# Patient Record
Sex: FEMALE | Race: BLACK OR AFRICAN AMERICAN | NOT HISPANIC OR LATINO | Employment: PART TIME | RURAL
[De-identification: names, ages, dates, MRNs, and addresses within clinical notes are randomized per-mention and may not be internally consistent; named-entity substitution may affect disease eponyms.]

---

## 2019-06-01 ENCOUNTER — HISTORICAL (OUTPATIENT)
Dept: ADMINISTRATIVE | Facility: HOSPITAL | Age: 59
End: 2019-06-01

## 2019-06-05 LAB
LAB AP CLINICAL INFORMATION: NORMAL
LAB AP COMMENTS: NORMAL
LAB AP DIAGNOSIS - HISTORICAL: NORMAL
LAB AP GROSS PATHOLOGY - HISTORICAL: NORMAL
LAB AP SPECIMEN SUBMITTED - HISTORICAL: NORMAL

## 2021-03-30 ENCOUNTER — OFFICE VISIT (OUTPATIENT)
Dept: GASTROENTEROLOGY | Facility: CLINIC | Age: 61
End: 2021-03-30
Payer: COMMERCIAL

## 2021-03-30 VITALS
WEIGHT: 218 LBS | SYSTOLIC BLOOD PRESSURE: 102 MMHG | HEART RATE: 110 BPM | OXYGEN SATURATION: 95 % | HEIGHT: 61 IN | BODY MASS INDEX: 41.16 KG/M2 | DIASTOLIC BLOOD PRESSURE: 66 MMHG

## 2021-03-30 DIAGNOSIS — K51.90 ULCERATIVE COLITIS WITHOUT COMPLICATIONS, UNSPECIFIED LOCATION: Primary | ICD-10-CM

## 2021-03-30 PROCEDURE — 99213 PR OFFICE/OUTPT VISIT, EST, LEVL III, 20-29 MIN: ICD-10-PCS | Mod: ,,, | Performed by: NURSE PRACTITIONER

## 2021-03-30 PROCEDURE — 99213 OFFICE O/P EST LOW 20 MIN: CPT | Mod: ,,, | Performed by: NURSE PRACTITIONER

## 2021-03-30 RX ORDER — LISINOPRIL AND HYDROCHLOROTHIAZIDE 10; 12.5 MG/1; MG/1
1 TABLET ORAL DAILY
COMMUNITY
Start: 2021-03-06

## 2021-03-30 RX ORDER — LOVASTATIN 20 MG/1
20 TABLET ORAL NIGHTLY
COMMUNITY
Start: 2021-03-06

## 2021-03-30 RX ORDER — MONTELUKAST SODIUM 10 MG/1
10 TABLET ORAL DAILY
COMMUNITY
Start: 2021-03-06

## 2021-03-30 RX ORDER — ALBUTEROL SULFATE 0.83 MG/ML
SOLUTION RESPIRATORY (INHALATION)
COMMUNITY
Start: 2021-03-18

## 2021-03-30 RX ORDER — DICLOFENAC SODIUM 20 MG/G
SOLUTION TOPICAL
COMMUNITY
Start: 2021-03-25

## 2021-03-30 RX ORDER — FOLIC ACID 1 MG/1
1000 TABLET ORAL DAILY
COMMUNITY
Start: 2021-03-17

## 2021-03-30 RX ORDER — SULFASALAZINE 500 MG/1
2 TABLET ORAL 3 TIMES DAILY
COMMUNITY
Start: 2021-01-23 | End: 2021-07-01 | Stop reason: SDUPTHER

## 2021-03-30 RX ORDER — FLUTICASONE PROPIONATE 50 MCG
1 SPRAY, SUSPENSION (ML) NASAL DAILY PRN
COMMUNITY
Start: 2021-03-18

## 2021-03-30 RX ORDER — CETIRIZINE HYDROCHLORIDE 10 MG/1
10 TABLET ORAL DAILY
COMMUNITY
Start: 2021-03-18

## 2021-03-30 RX ORDER — ALBUTEROL SULFATE 90 UG/1
2 AEROSOL, METERED RESPIRATORY (INHALATION) EVERY 6 HOURS
COMMUNITY
Start: 2021-03-18

## 2021-03-31 ENCOUNTER — TELEPHONE (OUTPATIENT)
Dept: GASTROENTEROLOGY | Facility: CLINIC | Age: 61
End: 2021-03-31

## 2021-03-31 DIAGNOSIS — D64.9 ANEMIA, UNSPECIFIED TYPE: Primary | ICD-10-CM

## 2021-07-01 DIAGNOSIS — K51.919 ULCERATIVE COLITIS WITH COMPLICATION, UNSPECIFIED LOCATION: Primary | ICD-10-CM

## 2021-07-01 RX ORDER — SULFASALAZINE 500 MG/1
1000 TABLET ORAL 3 TIMES DAILY
Qty: 180 TABLET | Refills: 6 | Status: SHIPPED | OUTPATIENT
Start: 2021-07-01 | End: 2021-07-19 | Stop reason: SDUPTHER

## 2021-07-19 ENCOUNTER — OFFICE VISIT (OUTPATIENT)
Dept: GASTROENTEROLOGY | Facility: CLINIC | Age: 61
End: 2021-07-19
Payer: COMMERCIAL

## 2021-07-19 VITALS
HEART RATE: 102 BPM | OXYGEN SATURATION: 95 % | DIASTOLIC BLOOD PRESSURE: 78 MMHG | WEIGHT: 221 LBS | HEIGHT: 61 IN | BODY MASS INDEX: 41.72 KG/M2 | SYSTOLIC BLOOD PRESSURE: 139 MMHG

## 2021-07-19 DIAGNOSIS — K51.919 ULCERATIVE COLITIS WITH COMPLICATION, UNSPECIFIED LOCATION: Primary | ICD-10-CM

## 2021-07-19 DIAGNOSIS — D64.9 ANEMIA, UNSPECIFIED TYPE: ICD-10-CM

## 2021-07-19 PROCEDURE — 99214 PR OFFICE/OUTPT VISIT, EST, LEVL IV, 30-39 MIN: ICD-10-PCS | Mod: ,,, | Performed by: NURSE PRACTITIONER

## 2021-07-19 PROCEDURE — 99214 OFFICE O/P EST MOD 30 MIN: CPT | Mod: ,,, | Performed by: NURSE PRACTITIONER

## 2021-07-19 RX ORDER — FERROUS SULFATE 324(65)MG
324 TABLET, DELAYED RELEASE (ENTERIC COATED) ORAL DAILY
COMMUNITY

## 2021-07-19 RX ORDER — SULFASALAZINE 500 MG/1
1000 TABLET ORAL 3 TIMES DAILY
Qty: 180 TABLET | Refills: 6 | Status: SHIPPED | OUTPATIENT
Start: 2021-07-19

## 2022-01-25 ENCOUNTER — OFFICE VISIT (OUTPATIENT)
Dept: GASTROENTEROLOGY | Facility: CLINIC | Age: 62
End: 2022-01-25
Payer: COMMERCIAL

## 2022-01-25 VITALS
BODY MASS INDEX: 41.54 KG/M2 | DIASTOLIC BLOOD PRESSURE: 53 MMHG | SYSTOLIC BLOOD PRESSURE: 103 MMHG | OXYGEN SATURATION: 96 % | HEIGHT: 61 IN | WEIGHT: 220 LBS | HEART RATE: 83 BPM

## 2022-01-25 DIAGNOSIS — R19.7 DIARRHEA, UNSPECIFIED TYPE: ICD-10-CM

## 2022-01-25 DIAGNOSIS — D50.9 CHRONIC IRON DEFICIENCY ANEMIA: ICD-10-CM

## 2022-01-25 DIAGNOSIS — K51.919 ULCERATIVE COLITIS WITH COMPLICATION, UNSPECIFIED LOCATION: Primary | ICD-10-CM

## 2022-01-25 PROBLEM — Z86.39 HISTORY OF IRON DEFICIENCY: Status: ACTIVE | Noted: 2022-01-25

## 2022-01-25 PROCEDURE — 3078F DIAST BP <80 MM HG: CPT | Mod: CPTII,,, | Performed by: NURSE PRACTITIONER

## 2022-01-25 PROCEDURE — 3078F PR MOST RECENT DIASTOLIC BLOOD PRESSURE < 80 MM HG: ICD-10-PCS | Mod: CPTII,,, | Performed by: NURSE PRACTITIONER

## 2022-01-25 PROCEDURE — 1159F PR MEDICATION LIST DOCUMENTED IN MEDICAL RECORD: ICD-10-PCS | Mod: CPTII,,, | Performed by: NURSE PRACTITIONER

## 2022-01-25 PROCEDURE — 3074F PR MOST RECENT SYSTOLIC BLOOD PRESSURE < 130 MM HG: ICD-10-PCS | Mod: CPTII,,, | Performed by: NURSE PRACTITIONER

## 2022-01-25 PROCEDURE — 1159F MED LIST DOCD IN RCRD: CPT | Mod: CPTII,,, | Performed by: NURSE PRACTITIONER

## 2022-01-25 PROCEDURE — 99214 OFFICE O/P EST MOD 30 MIN: CPT | Mod: ,,, | Performed by: NURSE PRACTITIONER

## 2022-01-25 PROCEDURE — 1160F PR REVIEW ALL MEDS BY PRESCRIBER/CLIN PHARMACIST DOCUMENTED: ICD-10-PCS | Mod: CPTII,,, | Performed by: NURSE PRACTITIONER

## 2022-01-25 PROCEDURE — 1160F RVW MEDS BY RX/DR IN RCRD: CPT | Mod: CPTII,,, | Performed by: NURSE PRACTITIONER

## 2022-01-25 PROCEDURE — 3074F SYST BP LT 130 MM HG: CPT | Mod: CPTII,,, | Performed by: NURSE PRACTITIONER

## 2022-01-25 PROCEDURE — 3008F PR BODY MASS INDEX (BMI) DOCUMENTED: ICD-10-PCS | Mod: CPTII,,, | Performed by: NURSE PRACTITIONER

## 2022-01-25 PROCEDURE — 3008F BODY MASS INDEX DOCD: CPT | Mod: CPTII,,, | Performed by: NURSE PRACTITIONER

## 2022-01-25 PROCEDURE — 99214 PR OFFICE/OUTPT VISIT, EST, LEVL IV, 30-39 MIN: ICD-10-PCS | Mod: ,,, | Performed by: NURSE PRACTITIONER

## 2022-01-25 RX ORDER — METRONIDAZOLE 500 MG/1
500 TABLET ORAL EVERY 12 HOURS
Qty: 20 TABLET | Refills: 0 | Status: SHIPPED | OUTPATIENT
Start: 2022-01-25 | End: 2022-02-04

## 2022-01-25 NOTE — PATIENT INSTRUCTIONS
Flagyl 500 mg every 12 hours for 10 days. No alcohol while taking Flagyl    No Ibuprofen, Advil, aleve    Increase water to 64 ounces daily

## 2022-01-25 NOTE — PROGRESS NOTES
Toyin Dia is a 61 y.o. female here for Follow-up and Ulcerative Colitis        PCP: Rita Vera  Referring Provider: No referring provider defined for this encounter.     HPI:  Presents for follow up ulcerative colitis. States that she has recently been having a flare. Reports that she has 3-4 loose stools in the am and has noticed some blood in her stool. States that she has not been taking sulfasalazine TID as directed. She is taking folic acid. Reports that PCM refills medications. No abdominal cramping. Denies change in appetite. No weight loss. Does have joint pain in knees. Has iron deficiency and is taking oral iron.        ROS:  Review of Systems   Constitutional: Negative for appetite change, fatigue, fever and unexpected weight change.   HENT: Negative for trouble swallowing.    Respiratory: Negative for shortness of breath and wheezing.    Cardiovascular: Negative for chest pain and palpitations.   Gastrointestinal: Positive for blood in stool and diarrhea. Negative for abdominal pain, change in bowel habit, constipation, nausea, rectal pain, vomiting, reflux, fecal incontinence and change in bowel habit.   Genitourinary: Negative for dysuria and frequency.   Musculoskeletal: Positive for arthralgias. Negative for back pain, gait problem and joint swelling.   Integumentary:  Negative for pallor.   Allergic/Immunologic: Negative for food allergies.   Neurological: Negative for dizziness, weakness and light-headedness.   Hematological: Does not bruise/bleed easily.   Psychiatric/Behavioral: The patient is not nervous/anxious.           PMHX:  has a past medical history of Asthma, Hypertension, Ulcerative colitis, and Ulcerative colitis.    PSHX:  has a past surgical history that includes Colonoscopy (06/01/2019) and Tubal ligation (1998).    PFHX: family history includes Asthma in her father and sister; Cancer in her brother, father, sister, and sister; Diabetes in her father and sister;  "Hyperlipidemia in her father; Hypertension in her brother, father, and mother; No Known Problems in her brother, sister, and sister.    PSlHX:  reports that she has never smoked. She has never used smokeless tobacco. She reports that she does not drink alcohol and does not use drugs.        Review of patient's allergies indicates:  No Known Allergies    Medication List with Changes/Refills   New Medications    METRONIDAZOLE (FLAGYL) 500 MG TABLET    Take 1 tablet (500 mg total) by mouth every 12 (twelve) hours. for 10 days   Current Medications    ALBUTEROL (PROVENTIL) 2.5 MG /3 ML (0.083 %) NEBULIZER SOLUTION    USE 1 VIAL IN NEBULIZER THREE TIMES DAILY AS NEEDED    ALBUTEROL (PROVENTIL/VENTOLIN HFA) 90 MCG/ACTUATION INHALER    Inhale 2 puffs into the lungs every 6 (six) hours.    CETIRIZINE (ZYRTEC) 10 MG TABLET    Take 10 mg by mouth once daily.    FERROUS SULFATE 324 MG (65 MG IRON) TBEC    Take 324 mg by mouth once daily.    FLUTICASONE PROPIONATE (FLONASE) 50 MCG/ACTUATION NASAL SPRAY    1 spray by Each Nostril route daily as needed.     FOLIC ACID (FOLVITE) 1 MG TABLET    Take 1,000 mcg by mouth once daily.    LISINOPRIL-HYDROCHLOROTHIAZIDE (PRINZIDE,ZESTORETIC) 10-12.5 MG PER TABLET    Take 1 tablet by mouth once daily.    LOVASTATIN (MEVACOR) 20 MG TABLET    Take 20 mg by mouth every evening.    MONTELUKAST (SINGULAIR) 10 MG TABLET    Take 10 mg by mouth once daily.    PENNSAID 20 MG/GRAM /ACTUATION(2 %) SOPM    APPLY 2 PUMPS TO THE AFFECTED KNEES TWICE DAILY    SULFASALAZINE (AZULFIDINE) 500 MG TAB    Take 2 tablets (1,000 mg total) by mouth 3 (three) times daily.        Objective Findings:  Vital Signs:  BP (!) 103/53   Pulse 83   Ht 5' 1" (1.549 m)   Wt 99.8 kg (220 lb)   SpO2 96%   BMI 41.57 kg/m²  Body mass index is 41.57 kg/m².    Physical Exam:  Physical Exam  Vitals and nursing note reviewed.   Constitutional:       General: She is not in acute distress.     Appearance: Normal appearance. "   HENT:      Mouth/Throat:      Mouth: Mucous membranes are moist.   Eyes:      General: No scleral icterus.  Cardiovascular:      Rate and Rhythm: Normal rate and regular rhythm.      Heart sounds: No murmur heard.      Pulmonary:      Breath sounds: No wheezing, rhonchi or rales.   Abdominal:      General: Bowel sounds are normal. There is no distension.      Palpations: Abdomen is soft. There is no mass.      Tenderness: There is no abdominal tenderness. There is no guarding or rebound.      Hernia: No hernia is present.   Musculoskeletal:      Right lower leg: No edema.      Left lower leg: No edema.   Skin:     General: Skin is warm and dry.      Coloration: Skin is not jaundiced or pale.      Findings: No bruising or rash.   Neurological:      Mental Status: She is alert and oriented to person, place, and time.   Psychiatric:         Mood and Affect: Mood normal.          Labs:  Lab Results   Component Value Date    WBC 10.34 01/25/2022    HGB 10.0 (L) 01/25/2022    HCT 33.0 (L) 01/25/2022    MCV 73.8 (L) 01/25/2022    RDW 14.6 (H) 01/25/2022     01/25/2022    LYMPH 32.0 01/25/2022    LYMPH 3.31 01/25/2022    MONO 9.8 (H) 01/25/2022    EOS 0.67 (H) 01/25/2022    BASO 0.08 01/25/2022     Lab Results   Component Value Date     01/25/2022    K 4.1 01/25/2022     01/25/2022    CO2 30 01/25/2022    GLU 97 01/25/2022    BUN 10 01/25/2022    CREATININE 0.88 01/25/2022    CALCIUM 8.9 01/25/2022    PROT 7.5 01/25/2022    ALBUMIN 3.6 01/25/2022    BILITOT 0.2 01/25/2022    ALKPHOS 72 01/25/2022    AST 16 01/25/2022    ALT 24 01/25/2022         Imaging: No results found.      Assessment:  Toyin Dia is a 61 y.o. female here with:  1. Ulcerative colitis with complication, unspecified location    2. Diarrhea, unspecified type    3. Chronic iron deficiency anemia          Recommendations:  1. Labs today. Stool studies  2. Flagyl 500 mg twice daily for 10 days  3. Continue sulfasalazine and folic acid  4.  Avoid NSAID's    Follow up in about 4 weeks (around 2/22/2022).      Order summary:  Orders Placed This Encounter    Fecal leukocytes    Giardia antigen    Enteric Pathogen Panel    C Diff Toxin by PCR    CBC Auto Differential    Comprehensive Metabolic Panel    C-Reactive Protein    Iron and TIBC    Ferritin    Occult blood x 1, stool    Fecal fat, qualitative    Calprotectin, Stool    metroNIDAZOLE (FLAGYL) 500 MG tablet       Thank you for allowing me to participate in the care of Toyin Dia.      FRIEDA LezamaC

## 2022-02-10 ENCOUNTER — TELEPHONE (OUTPATIENT)
Dept: GASTROENTEROLOGY | Facility: CLINIC | Age: 62
End: 2022-02-10
Payer: COMMERCIAL

## 2022-02-10 DIAGNOSIS — R19.7 DIARRHEA, UNSPECIFIED TYPE: Primary | ICD-10-CM

## 2022-02-10 LAB — OCCULT BLOOD: POSITIVE

## 2022-02-10 PROCEDURE — 82272 OCCULT BLOOD X 1, STOOL: ICD-10-PCS | Mod: ,,, | Performed by: CLINICAL MEDICAL LABORATORY

## 2022-02-10 PROCEDURE — 82272 OCCULT BLD FECES 1-3 TESTS: CPT | Mod: ,,, | Performed by: CLINICAL MEDICAL LABORATORY

## 2022-02-10 NOTE — TELEPHONE ENCOUNTER
Called patient to inform that we need to recollect stool because there was not enough stool collected to preform all the tests ordered. Patient states she will bring a new sample into the lab.      ----- Message from OVI Almendarez sent at 2/10/2022  1:51 PM CST -----  Stool is positive for blood. She has UC. They cancelled enteric pathogen, calprotectin, fecal fat, and giardia. She will have to recollect. She is scheduled for follow up. Hopefully can turn in stools prior to office visit.

## 2022-02-18 LAB
C COLI+JEJ+UPSA DNA STL QL NAA+NON-PROBE: NEGATIVE
E COLI SXT1 STL QL IA: NEGATIVE
E COLI SXT2 STL QL IA: NEGATIVE
GIARDIA ANTIGEN: NEGATIVE
NOROVIRUS GI+II RNA STL QL NAA+NON-PROBE: NEGATIVE
RVA RNA STL QL NAA+NON-PROBE: NEGATIVE
S ENT+BONG DNA STL QL NAA+NON-PROBE: NEGATIVE
SHIGELLA SPECIES NAT: NEGATIVE
V CHOL+PARA+VUL DNA STL QL NAA+NON-PROBE: NEGATIVE
Y ENTEROCOL DNA STL QL NAA+NON-PROBE: NEGATIVE

## 2022-02-18 PROCEDURE — 87506 ENTERIC PATHOGEN PANEL: ICD-10-PCS | Mod: ,,, | Performed by: CLINICAL MEDICAL LABORATORY

## 2022-02-18 PROCEDURE — 87329 GIARDIA AG IA: CPT | Mod: 59,,, | Performed by: CLINICAL MEDICAL LABORATORY

## 2022-02-18 PROCEDURE — 87329 GIARDIA ANTIGEN: ICD-10-PCS | Mod: 59,,, | Performed by: CLINICAL MEDICAL LABORATORY

## 2022-02-18 PROCEDURE — 87506 IADNA-DNA/RNA PROBE TQ 6-11: CPT | Mod: ,,, | Performed by: CLINICAL MEDICAL LABORATORY

## 2022-02-21 ENCOUNTER — TELEPHONE (OUTPATIENT)
Dept: GASTROENTEROLOGY | Facility: CLINIC | Age: 62
End: 2022-02-21
Payer: COMMERCIAL

## 2022-02-21 NOTE — TELEPHONE ENCOUNTER
Called lab results and reminded patient of follow up appointment. Patient verbalized good understanding.      ----- Message from OVI Almendarez sent at 2/21/2022  7:14 AM CST -----  Enteric pathogen is normal

## 2022-02-22 ENCOUNTER — OFFICE VISIT (OUTPATIENT)
Dept: GASTROENTEROLOGY | Facility: CLINIC | Age: 62
End: 2022-02-22
Payer: COMMERCIAL

## 2022-02-22 VITALS
OXYGEN SATURATION: 95 % | SYSTOLIC BLOOD PRESSURE: 90 MMHG | BODY MASS INDEX: 41.16 KG/M2 | HEART RATE: 79 BPM | HEIGHT: 61 IN | WEIGHT: 218 LBS | DIASTOLIC BLOOD PRESSURE: 61 MMHG

## 2022-02-22 DIAGNOSIS — K51.919 ULCERATIVE COLITIS WITH COMPLICATION, UNSPECIFIED LOCATION: Primary | ICD-10-CM

## 2022-02-22 DIAGNOSIS — K62.5 RECTAL BLEEDING: ICD-10-CM

## 2022-02-22 DIAGNOSIS — D50.9 CHRONIC IRON DEFICIENCY ANEMIA: ICD-10-CM

## 2022-02-22 DIAGNOSIS — Z11.59 SCREENING FOR VIRAL DISEASE: ICD-10-CM

## 2022-02-22 DIAGNOSIS — D64.9 ANEMIA, UNSPECIFIED TYPE: ICD-10-CM

## 2022-02-22 PROCEDURE — 3078F PR MOST RECENT DIASTOLIC BLOOD PRESSURE < 80 MM HG: ICD-10-PCS | Mod: CPTII,,, | Performed by: NURSE PRACTITIONER

## 2022-02-22 PROCEDURE — 3074F SYST BP LT 130 MM HG: CPT | Mod: CPTII,,, | Performed by: NURSE PRACTITIONER

## 2022-02-22 PROCEDURE — 1160F RVW MEDS BY RX/DR IN RCRD: CPT | Mod: CPTII,,, | Performed by: NURSE PRACTITIONER

## 2022-02-22 PROCEDURE — 1160F PR REVIEW ALL MEDS BY PRESCRIBER/CLIN PHARMACIST DOCUMENTED: ICD-10-PCS | Mod: CPTII,,, | Performed by: NURSE PRACTITIONER

## 2022-02-22 PROCEDURE — 1159F MED LIST DOCD IN RCRD: CPT | Mod: CPTII,,, | Performed by: NURSE PRACTITIONER

## 2022-02-22 PROCEDURE — 99214 PR OFFICE/OUTPT VISIT, EST, LEVL IV, 30-39 MIN: ICD-10-PCS | Mod: ,,, | Performed by: NURSE PRACTITIONER

## 2022-02-22 PROCEDURE — 3078F DIAST BP <80 MM HG: CPT | Mod: CPTII,,, | Performed by: NURSE PRACTITIONER

## 2022-02-22 PROCEDURE — 3008F BODY MASS INDEX DOCD: CPT | Mod: CPTII,,, | Performed by: NURSE PRACTITIONER

## 2022-02-22 PROCEDURE — 3074F PR MOST RECENT SYSTOLIC BLOOD PRESSURE < 130 MM HG: ICD-10-PCS | Mod: CPTII,,, | Performed by: NURSE PRACTITIONER

## 2022-02-22 PROCEDURE — 1159F PR MEDICATION LIST DOCUMENTED IN MEDICAL RECORD: ICD-10-PCS | Mod: CPTII,,, | Performed by: NURSE PRACTITIONER

## 2022-02-22 PROCEDURE — 3008F PR BODY MASS INDEX (BMI) DOCUMENTED: ICD-10-PCS | Mod: CPTII,,, | Performed by: NURSE PRACTITIONER

## 2022-02-22 PROCEDURE — 99214 OFFICE O/P EST MOD 30 MIN: CPT | Mod: ,,, | Performed by: NURSE PRACTITIONER

## 2022-02-22 NOTE — PROGRESS NOTES
Toyin Dia is a 61 y.o. female here for Follow-up        PCP: Rita Vear  Referring Provider: No referring provider defined for this encounter.     HPI:  Presents for one month follow up. She did take a course of Flagyl. Patient continue to have approximately six diarrhea stools per day. Occasional nocturnal diarrhea. Stool studies were completed. She does have blood in her stool. Calprotectin was elevated at 2196. Last colonoscopy June 1, 2019. Reports reviewed, active chronic colitis in the descending, sigmoid, and rectum. Recommendations to repeat in 3 years. Denies cramping. Does have WALDEMAR. Taking oral iron. Last Hgb 10 and Hct 33.        ROS:  Review of Systems   Constitutional: Negative for appetite change, fatigue, fever and unexpected weight change.   HENT: Negative for trouble swallowing.    Respiratory: Negative for shortness of breath and wheezing.    Cardiovascular: Negative for chest pain and palpitations.   Gastrointestinal: Positive for blood in stool and diarrhea (6 times per day). Negative for abdominal pain, anal bleeding, change in bowel habit, constipation, nausea, vomiting, reflux and change in bowel habit.   Genitourinary: Negative for dysuria and frequency.   Musculoskeletal: Negative for back pain, gait problem and joint swelling.   Integumentary:  Negative for pallor.   Allergic/Immunologic: Negative for food allergies.   Neurological: Negative for dizziness, weakness and light-headedness.   Psychiatric/Behavioral: The patient is not nervous/anxious.           PMHX:  has a past medical history of Asthma, Hypertension, Ulcerative colitis, and Ulcerative colitis.    PSHX:  has a past surgical history that includes Colonoscopy (06/01/2019) and Tubal ligation (1998).    PFHX: family history includes Asthma in her father and sister; Cancer in her brother, father, sister, and sister; Diabetes in her father and sister; Hyperlipidemia in her father; Hypertension in her brother, father, and  "mother; No Known Problems in her brother, sister, and sister.    PSlHX:  reports that she has never smoked. She has never used smokeless tobacco. She reports that she does not drink alcohol and does not use drugs.        Review of patient's allergies indicates:  No Known Allergies    Medication List with Changes/Refills   Current Medications    ALBUTEROL (PROVENTIL) 2.5 MG /3 ML (0.083 %) NEBULIZER SOLUTION    USE 1 VIAL IN NEBULIZER THREE TIMES DAILY AS NEEDED    ALBUTEROL (PROVENTIL/VENTOLIN HFA) 90 MCG/ACTUATION INHALER    Inhale 2 puffs into the lungs every 6 (six) hours.    CETIRIZINE (ZYRTEC) 10 MG TABLET    Take 10 mg by mouth once daily.    FERROUS SULFATE 324 MG (65 MG IRON) TBEC    Take 324 mg by mouth once daily.    FLUTICASONE PROPIONATE (FLONASE) 50 MCG/ACTUATION NASAL SPRAY    1 spray by Each Nostril route daily as needed.     FOLIC ACID (FOLVITE) 1 MG TABLET    Take 1,000 mcg by mouth once daily.    LISINOPRIL-HYDROCHLOROTHIAZIDE (PRINZIDE,ZESTORETIC) 10-12.5 MG PER TABLET    Take 1 tablet by mouth once daily.    LOVASTATIN (MEVACOR) 20 MG TABLET    Take 20 mg by mouth every evening.    MONTELUKAST (SINGULAIR) 10 MG TABLET    Take 10 mg by mouth once daily.    PENNSAID 20 MG/GRAM /ACTUATION(2 %) SOPM    APPLY 2 PUMPS TO THE AFFECTED KNEES TWICE DAILY    SULFASALAZINE (AZULFIDINE) 500 MG TAB    Take 2 tablets (1,000 mg total) by mouth 3 (three) times daily.        Objective Findings:  Vital Signs:  BP 90/61   Pulse 79   Ht 5' 1" (1.549 m)   Wt 98.9 kg (218 lb)   SpO2 95%   BMI 41.19 kg/m²  Body mass index is 41.19 kg/m².    Physical Exam:  Physical Exam  Vitals and nursing note reviewed.   Constitutional:       General: She is not in acute distress.     Appearance: Normal appearance. She is not ill-appearing.   HENT:      Mouth/Throat:      Mouth: Mucous membranes are moist.   Eyes:      General: No scleral icterus.  Cardiovascular:      Rate and Rhythm: Normal rate and regular rhythm. "   Pulmonary:      Breath sounds: No wheezing, rhonchi or rales.   Abdominal:      General: Bowel sounds are normal. There is no distension.      Palpations: Abdomen is soft. There is no mass.      Tenderness: There is no abdominal tenderness. There is no guarding or rebound.      Hernia: No hernia is present.   Musculoskeletal:      Right lower leg: No edema.      Left lower leg: No edema.   Skin:     General: Skin is warm and dry.      Coloration: Skin is not jaundiced or pale.      Findings: No bruising or rash.   Neurological:      Mental Status: She is alert and oriented to person, place, and time.   Psychiatric:         Mood and Affect: Mood normal.          Labs:  Lab Results   Component Value Date    WBC 10.34 01/25/2022    HGB 10.0 (L) 01/25/2022    HCT 33.0 (L) 01/25/2022    MCV 73.8 (L) 01/25/2022    RDW 14.6 (H) 01/25/2022     01/25/2022    LYMPH 32.0 01/25/2022    LYMPH 3.31 01/25/2022    MONO 9.8 (H) 01/25/2022    EOS 0.67 (H) 01/25/2022    BASO 0.08 01/25/2022     Lab Results   Component Value Date     01/25/2022    K 4.1 01/25/2022     01/25/2022    CO2 30 01/25/2022    GLU 97 01/25/2022    BUN 10 01/25/2022    CREATININE 0.88 01/25/2022    CALCIUM 8.9 01/25/2022    PROT 7.5 01/25/2022    ALBUMIN 3.6 01/25/2022    BILITOT 0.2 01/25/2022    ALKPHOS 72 01/25/2022    AST 16 01/25/2022    ALT 24 01/25/2022         Imaging: No results found.      Assessment:  Toyin Dia is a 61 y.o. female here with:  1. Ulcerative colitis with complication, unspecified location    2. Anemia, unspecified type    3. Chronic iron deficiency anemia    4. Rectal bleeding    5. Screening for viral disease          Recommendations:  1. Continue Sulfasalazine   2. Consider biologic therapy if not controlled  3. Continue oral iron  4. Schedule colonoscopy    Follow up in about 4 weeks (around 3/22/2022).      Order summary:  Orders Placed This Encounter    POCT COVID-19 Rapid Screening    Colonoscopy        Thank you for allowing me to participate in the care of Toyin Dia.      GRACE Lezama

## 2022-07-14 ENCOUNTER — ANESTHESIA (OUTPATIENT)
Dept: GASTROENTEROLOGY | Facility: HOSPITAL | Age: 62
End: 2022-07-14
Payer: COMMERCIAL

## 2022-07-14 ENCOUNTER — HOSPITAL ENCOUNTER (OUTPATIENT)
Dept: GASTROENTEROLOGY | Facility: HOSPITAL | Age: 62
Discharge: HOME | End: 2022-07-14
Attending: NURSE PRACTITIONER
Payer: COMMERCIAL

## 2022-07-14 ENCOUNTER — ANESTHESIA EVENT (OUTPATIENT)
Dept: GASTROENTEROLOGY | Facility: HOSPITAL | Age: 62
End: 2022-07-14
Payer: COMMERCIAL

## 2022-07-14 VITALS
HEART RATE: 88 BPM | OXYGEN SATURATION: 97 % | RESPIRATION RATE: 20 BRPM | DIASTOLIC BLOOD PRESSURE: 65 MMHG | TEMPERATURE: 98 F | SYSTOLIC BLOOD PRESSURE: 100 MMHG

## 2022-07-14 DIAGNOSIS — Z12.11 SCREENING FOR COLON CANCER: ICD-10-CM

## 2022-07-14 DIAGNOSIS — K63.5 POLYP OF TRANSVERSE COLON, UNSPECIFIED TYPE: ICD-10-CM

## 2022-07-14 DIAGNOSIS — D50.9 CHRONIC IRON DEFICIENCY ANEMIA: ICD-10-CM

## 2022-07-14 DIAGNOSIS — K62.5 RECTAL BLEEDING: ICD-10-CM

## 2022-07-14 DIAGNOSIS — D64.9 ANEMIA, UNSPECIFIED TYPE: ICD-10-CM

## 2022-07-14 DIAGNOSIS — K51.919 ULCERATIVE COLITIS WITH COMPLICATION, UNSPECIFIED LOCATION: ICD-10-CM

## 2022-07-14 PROBLEM — K51.90 ULCERATIVE COLITIS: Status: ACTIVE | Noted: 2021-07-19

## 2022-07-14 PROCEDURE — 88305 SURGICAL PATHOLOGY: ICD-10-PCS | Mod: 26,XU,, | Performed by: PATHOLOGY

## 2022-07-14 PROCEDURE — 88305 TISSUE EXAM BY PATHOLOGIST: CPT | Mod: 26,,, | Performed by: PATHOLOGY

## 2022-07-14 PROCEDURE — 45380 COLONOSCOPY AND BIOPSY: CPT

## 2022-07-14 PROCEDURE — 37000009 HC ANESTHESIA EA ADD 15 MINS

## 2022-07-14 PROCEDURE — D9220A PRA ANESTHESIA: Mod: ,,,

## 2022-07-14 PROCEDURE — 88305 TISSUE EXAM BY PATHOLOGIST: CPT | Mod: SUR | Performed by: INTERNAL MEDICINE

## 2022-07-14 PROCEDURE — 37000008 HC ANESTHESIA 1ST 15 MINUTES

## 2022-07-14 PROCEDURE — C1889 IMPLANT/INSERT DEVICE, NOC: HCPCS

## 2022-07-14 PROCEDURE — 27000284 HC CANNULA NASAL

## 2022-07-14 PROCEDURE — 27000716 HC OXISENSOR PROBE, ANY SIZE

## 2022-07-14 PROCEDURE — 25000003 PHARM REV CODE 250

## 2022-07-14 PROCEDURE — D9220A PRA ANESTHESIA: ICD-10-PCS | Mod: ,,,

## 2022-07-14 PROCEDURE — 63600175 PHARM REV CODE 636 W HCPCS

## 2022-07-14 PROCEDURE — 27201423 OPTIME MED/SURG SUP & DEVICES STERILE SUPPLY

## 2022-07-14 RX ORDER — SODIUM CHLORIDE 0.9 % (FLUSH) 0.9 %
10 SYRINGE (ML) INJECTION
Status: DISCONTINUED | OUTPATIENT
Start: 2022-07-14 | End: 2022-07-15 | Stop reason: HOSPADM

## 2022-07-14 RX ORDER — PROPOFOL 10 MG/ML
VIAL (ML) INTRAVENOUS
Status: DISCONTINUED | OUTPATIENT
Start: 2022-07-14 | End: 2022-07-14

## 2022-07-14 RX ORDER — LIDOCAINE HYDROCHLORIDE 20 MG/ML
INJECTION, SOLUTION EPIDURAL; INFILTRATION; INTRACAUDAL; PERINEURAL
Status: DISCONTINUED | OUTPATIENT
Start: 2022-07-14 | End: 2022-07-14

## 2022-07-14 RX ADMIN — LIDOCAINE HYDROCHLORIDE 100 MG: 20 INJECTION, SOLUTION INTRAVENOUS at 02:07

## 2022-07-14 RX ADMIN — PROPOFOL 50 MG: 10 INJECTION, EMULSION INTRAVENOUS at 02:07

## 2022-07-14 RX ADMIN — SODIUM CHLORIDE: 9 INJECTION, SOLUTION INTRAVENOUS at 01:07

## 2022-07-14 NOTE — ANESTHESIA PREPROCEDURE EVALUATION
07/14/2022  Toyin Dia is a 62 y.o., female.      Pre-op Assessment    I have reviewed the Patient Summary Reports.     I have reviewed the Nursing Notes. I have reviewed the NPO Status.   I have reviewed the Medications.     Review of Systems  Anesthesia Hx:  No problems with previous Anesthesia    Social:  Non-Smoker, No Alcohol Use    Cardiovascular:   Hypertension    Pulmonary:   Asthma    Hepatic/GI:   PUD,    Endocrine:  Obesity / BMI > 30      Physical Exam  General: Well nourished, Cooperative, Alert and Oriented    Airway:  Mallampati: II   Mouth Opening: Normal  Neck ROM: Normal ROM    Dental:  Dentures    Chest/Lungs:  Normal Respiratory Rate    Heart:  Rate: Normal  Rhythm: Regular Rhythm        Anesthesia Plan  Type of Anesthesia, risks & benefits discussed:    Anesthesia Type: Gen Natural Airway  Intra-op Monitoring Plan: Standard ASA Monitors  Post Op Pain Control Plan: multimodal analgesia  Induction:  IV  Informed Consent: Informed consent signed with the Patient and all parties understand the risks and agree with anesthesia plan.  All questions answered.   ASA Score: 3  Day of Surgery Review of History & Physical: I have interviewed and examined the patient. I have reviewed the patient's H&P dated:     Ready For Surgery From Anesthesia Perspective.     .

## 2022-07-14 NOTE — H&P
[facility]  Gastroenterology  H&P    Patient Name: Toyin Dia  MRN: [id number]  Admission Date: 7/14/2022  Code Status: Full Code    Attending Provider: Ryne Chauhan MD  Primary Care Physician: GRACE Delgado  Principal Problem:<principal problem not specified>    Subjective:     History of Present Illness: Pt has UC and her last colonoscopy was 2019.    Past Medical History:   Diagnosis Date    Asthma     Hypertension     Ulcerative colitis     Ulcerative colitis        Past Surgical History:   Procedure Laterality Date    COLONOSCOPY  06/01/2019    repeat in 3 years    TUBAL LIGATION  1998       Review of patient's allergies indicates:  No Known Allergies  Family History     Problem Relation (Age of Onset)    Asthma Father, Sister    Cancer Father, Sister, Sister, Brother    Diabetes Father, Sister    Hyperlipidemia Father    Hypertension Mother, Father, Brother    No Known Problems Brother, Sister, Sister        Tobacco Use    Smoking status: Never Smoker    Smokeless tobacco: Never Used   Substance and Sexual Activity    Alcohol use: Never    Drug use: Never    Sexual activity: Yes     Review of Systems   Respiratory: Negative.    Cardiovascular: Negative.    Gastrointestinal: Negative.      Objective:     Vital Signs (Most Recent):  Temp: 97 °F (36.1 °C) (07/14/22 1256)  Pulse: 90 (07/14/22 1256)  Resp: (!) 30 (07/14/22 1256)  BP: 122/73 (07/14/22 1256)  SpO2: 98 % (07/14/22 1256) Vital Signs (24h Range):  Temp:  [97 °F (36.1 °C)] 97 °F (36.1 °C)  Pulse:  [90] 90  Resp:  [30] 30  SpO2:  [98 %] 98 %  BP: (122)/(73) 122/73        There is no height or weight on file to calculate BMI.    No intake or output data in the 24 hours ending 07/14/22 1403    Lines/Drains/Airways     None                 Physical Exam  Vitals reviewed.   Constitutional:       General: She is not in acute distress.     Appearance: Normal appearance. She is well-developed. She is obese. She is not ill-appearing.    HENT:      Head: Normocephalic and atraumatic.      Nose: Nose normal.   Eyes:      Pupils: Pupils are equal, round, and reactive to light.   Cardiovascular:      Rate and Rhythm: Normal rate and regular rhythm.   Pulmonary:      Effort: Pulmonary effort is normal.      Breath sounds: Normal breath sounds. No wheezing.   Abdominal:      General: Abdomen is flat. Bowel sounds are normal. There is no distension.      Palpations: Abdomen is soft.      Tenderness: There is no abdominal tenderness. There is no guarding.   Skin:     General: Skin is warm and dry.      Coloration: Skin is not jaundiced.   Neurological:      Mental Status: She is alert.   Psychiatric:         Attention and Perception: Attention normal.         Mood and Affect: Affect normal.         Speech: Speech normal.         Behavior: Behavior is cooperative.      Comments: Pt was calm while speaking.         Significant Labs:  CBC: No results for input(s): WBC, HGB, HCT, PLT in the last 48 hours.  CMP: No results for input(s): GLU, CALCIUM, ALBUMIN, PROT, NA, K, CO2, CL, BUN, CREATININE, ALKPHOS, ALT, AST, BILITOT in the last 48 hours.    Significant Imaging:  Imaging results within the past 24 hours have been reviewed.    Assessment/Plan:     There are no hospital problems to display for this patient.        Imp: ulcerative colitis  Plan: colonoscopy    Ryne Chauhan MD  Gastroenterology  [facility]

## 2022-07-14 NOTE — DISCHARGE INSTRUCTIONS
Procedure Date  7/14/22     Impression  Overall Impression: Mild erythema was noted in the descending colon, but there was no other evidence of colitis. Multiple biopsies were obtained. A diminutive polyp was removed with biopsy from the transverse colon.     Recommendation  Await pathology results  Repeat colonoscopy in 3 years; avoid nsaids; continue home meds.     No driving today, no operating heavy machinery, no signing any legal documents until tomorrow.    Drink lots of fluids, resume regular diet.  Take your normal medications.

## 2022-07-14 NOTE — TRANSFER OF CARE
Anesthesia Transfer of Care Note    Patient: Toyin Dia    Procedure(s) Performed: * Colonoscopy     Patient location: GI    Anesthesia Type: general    Transport from OR: Transported from OR on room air with adequate spontaneous ventilation. Continuous ECG monitoring in transport. Continuous SpO2 monitoring in transport    Post pain: adequate analgesia    Post assessment: no apparent anesthetic complications    Post vital signs: stable    Level of consciousness: sedated and responds to stimulation    Nausea/Vomiting: no nausea/vomiting    Complications: none    Transfer of care protocol was followedComments: Good SV continue, NAD, VSS, RTRN      Last vitals:   Visit Vitals  BP (!) 103/49 (BP Location: Left arm, Patient Position: Lying)   Pulse 99   Temp 36.7 °C (98 °F) (Oral)   Resp 19   SpO2 95%

## 2022-07-14 NOTE — ANESTHESIA POSTPROCEDURE EVALUATION
Anesthesia Post Evaluation    Patient: Toyin Dia    Procedure(s) Performed: * Colonoscopy     Final Anesthesia Type: general      Patient location during evaluation: GI PACU  Patient participation: Yes- Able to Participate  Level of consciousness: awake and alert  Post-procedure vital signs: reviewed and stable  Pain management: adequate  Airway patency: patent    PONV status at discharge: No PONV  Anesthetic complications: no      Cardiovascular status: blood pressure returned to baseline and hemodynamically stable  Respiratory status: spontaneous ventilation  Hydration status: euvolemic  Follow-up not needed.  Comments: Pt voices appreciation for care          Vitals Value Taken Time   /65 07/14/22 1432   Temp 36.7 °C (98 °F) 07/14/22 1417   Pulse 88 07/14/22 1441   Resp 18 07/14/22 1441   SpO2 97 % 07/14/22 1441   Vitals shown include unvalidated device data.      No case tracking events are documented in the log.      Pain/Cindy Score: Cindy Score: 10 (7/14/2022  2:34 PM)

## 2022-07-15 LAB
DHEA SERPL-MCNC: NORMAL
ESTROGEN SERPL-MCNC: NORMAL PG/ML
INSULIN SERPL-ACNC: NORMAL U[IU]/ML
LAB AP GROSS DESCRIPTION: NORMAL
LAB AP LABORATORY NOTES: NORMAL
T3RU NFR SERPL: NORMAL %

## 2022-07-18 ENCOUNTER — TELEPHONE (OUTPATIENT)
Dept: GASTROENTEROLOGY | Facility: CLINIC | Age: 62
End: 2022-07-18
Payer: COMMERCIAL

## 2025-06-24 ENCOUNTER — TELEPHONE (OUTPATIENT)
Dept: GASTROENTEROLOGY | Facility: CLINIC | Age: 65
End: 2025-06-24
Payer: COMMERCIAL

## 2025-06-25 DIAGNOSIS — Z86.0100 HX OF COLONIC POLYP: Primary | ICD-10-CM
